# Patient Record
Sex: MALE | URBAN - METROPOLITAN AREA
[De-identification: names, ages, dates, MRNs, and addresses within clinical notes are randomized per-mention and may not be internally consistent; named-entity substitution may affect disease eponyms.]

---

## 2020-05-07 ENCOUNTER — APPOINTMENT (RX ONLY)
Dept: URBAN - METROPOLITAN AREA CLINIC 35 | Facility: CLINIC | Age: 35
Setting detail: DERMATOLOGY
End: 2020-05-07

## 2020-05-07 DIAGNOSIS — L50.5 CHOLINERGIC URTICARIA: ICD-10-CM

## 2020-05-07 PROBLEM — L50.1 IDIOPATHIC URTICARIA: Status: ACTIVE | Noted: 2020-05-07

## 2020-05-07 PROCEDURE — ? COUNSELING

## 2020-05-07 PROCEDURE — 99202 OFFICE O/P NEW SF 15 MIN: CPT | Mod: 95

## 2020-05-07 PROCEDURE — ? DIAGNOSIS COMMENT

## 2020-05-07 PROCEDURE — ? TREATMENT REGIMEN

## 2020-05-07 ASSESSMENT — LOCATION ZONE DERM
LOCATION ZONE: ARM
LOCATION ZONE: TRUNK

## 2020-05-07 ASSESSMENT — LOCATION DETAILED DESCRIPTION DERM
LOCATION DETAILED: EPIGASTRIC SKIN
LOCATION DETAILED: LEFT ANTERIOR SHOULDER
LOCATION DETAILED: RIGHT ANTERIOR SHOULDER
LOCATION DETAILED: RIGHT MEDIAL INFERIOR CHEST

## 2020-05-07 ASSESSMENT — LOCATION SIMPLE DESCRIPTION DERM
LOCATION SIMPLE: LEFT SHOULDER
LOCATION SIMPLE: CHEST
LOCATION SIMPLE: ABDOMEN
LOCATION SIMPLE: RIGHT SHOULDER

## 2020-05-07 NOTE — PROCEDURE: TREATMENT REGIMEN
Detail Level: Zone
Initiate Treatment: Allegra, Claritin, Zyrtec or other non-sedating antihistamine - BID\\nRecommend mineral sunscreens applied before sun exposure

## 2020-05-07 NOTE — HPI: RASH
What Type Of Note Output Would You Prefer (Optional)?: Standard Output
Is The Patient Presenting As Previously Scheduled?: Yes
How Severe Is Your Rash?: moderate
Is This A New Presentation, Or A Follow-Up?: Rash
Additional History: Ice alleviates if applied at prodrome

## 2020-05-07 NOTE — PROCEDURE: DIAGNOSIS COMMENT
Detail Level: Simple
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 Discussed that urticaria can be induced by both sweating and UV light; recommended physical sunscreen and antihistamine regimen of 2 tabs of Claritin daily; discussed if this helps but does not clear symptoms, consider increasing antihistamine regimen further. If persistent, consider alternative photosensitive conditions such as PMLE.

## 2020-06-18 ENCOUNTER — APPOINTMENT (RX ONLY)
Dept: URBAN - METROPOLITAN AREA CLINIC 35 | Facility: CLINIC | Age: 35
Setting detail: DERMATOLOGY
End: 2020-06-18

## 2020-06-18 DIAGNOSIS — L50.5 CHOLINERGIC URTICARIA: ICD-10-CM | Status: RESOLVING

## 2020-06-18 PROCEDURE — 99213 OFFICE O/P EST LOW 20 MIN: CPT

## 2020-06-18 PROCEDURE — ? COUNSELING

## 2020-06-18 PROCEDURE — ? DIAGNOSIS COMMENT

## 2020-06-18 PROCEDURE — ? TREATMENT REGIMEN

## 2020-06-18 ASSESSMENT — LOCATION SIMPLE DESCRIPTION DERM
LOCATION SIMPLE: ABDOMEN
LOCATION SIMPLE: CHEST
LOCATION SIMPLE: LEFT SHOULDER
LOCATION SIMPLE: RIGHT SHOULDER

## 2020-06-18 ASSESSMENT — LOCATION DETAILED DESCRIPTION DERM
LOCATION DETAILED: EPIGASTRIC SKIN
LOCATION DETAILED: RIGHT MEDIAL INFERIOR CHEST
LOCATION DETAILED: LEFT ANTERIOR SHOULDER
LOCATION DETAILED: RIGHT ANTERIOR SHOULDER

## 2020-06-18 ASSESSMENT — LOCATION ZONE DERM
LOCATION ZONE: TRUNK
LOCATION ZONE: ARM

## 2020-06-18 NOTE — PROCEDURE: TREATMENT REGIMEN
Otc Regimen: CeraVe itch relief or Sarna original as needed
Plan: Allegra 2 tabs in AM\\nZyrtec 2 tabs in PM\\n\\n** When itching has subsided, can decrease Allegra and Zyrtec to 1 tab each **
Detail Level: Zone

## 2020-06-18 NOTE — PROCEDURE: DIAGNOSIS COMMENT
Detail Level: Simple
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 Discussed that urticaria can be induced by both sweating and UV light; recommended physical sunscreen and antihistamine regimen of 2 tabs of Allegra qam and 2 tabs of Claritin qpm. If well-controlled, taper by 1 tab of either Allergra or Claritin q2 weeks

## 2020-12-04 ENCOUNTER — APPOINTMENT (RX ONLY)
Dept: URBAN - METROPOLITAN AREA CLINIC 35 | Facility: CLINIC | Age: 35
Setting detail: DERMATOLOGY
End: 2020-12-04

## 2020-12-04 DIAGNOSIS — L50.5 CHOLINERGIC URTICARIA: ICD-10-CM

## 2020-12-04 PROCEDURE — 99213 OFFICE O/P EST LOW 20 MIN: CPT | Mod: 95

## 2020-12-04 PROCEDURE — ? DIAGNOSIS COMMENT

## 2020-12-04 PROCEDURE — ? COUNSELING

## 2020-12-04 PROCEDURE — ? TREATMENT REGIMEN

## 2020-12-04 ASSESSMENT — LOCATION ZONE DERM
LOCATION ZONE: TRUNK
LOCATION ZONE: ARM

## 2020-12-04 ASSESSMENT — LOCATION DETAILED DESCRIPTION DERM
LOCATION DETAILED: RIGHT ANTERIOR SHOULDER
LOCATION DETAILED: EPIGASTRIC SKIN
LOCATION DETAILED: RIGHT MEDIAL INFERIOR CHEST
LOCATION DETAILED: LEFT ANTERIOR SHOULDER

## 2020-12-04 ASSESSMENT — LOCATION SIMPLE DESCRIPTION DERM
LOCATION SIMPLE: CHEST
LOCATION SIMPLE: ABDOMEN
LOCATION SIMPLE: RIGHT SHOULDER
LOCATION SIMPLE: LEFT SHOULDER

## 2020-12-04 NOTE — PROCEDURE: TREATMENT REGIMEN
Problem: Communication  Goal: The ability to communicate needs accurately and effectively will improve  Outcome: MET Date Met: 10/26/18  Patient calls approp for needs, english speaking, uses call bell       
Otc Regimen: CeraVe itch relief or Sarna original as needed
Plan: Allegra 2 tabs in AM\\nZyrtec 2 tabs in PM\\n\\n** When itching has subsided, can decrease Allegra and Zyrtec to 1 tab each **
Detail Level: Zone

## 2020-12-04 NOTE — PROCEDURE: DIAGNOSIS COMMENT
Detail Level: Simple
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 Discussed that urticaria can be induced by both sweating and UV light; stinging with chemical sunscreen so recommended physical sunscreen \\n-Pt taking 1 Allegra and 1 Claritin daily; recommended antihistamine regimen of 2 tabs of Allegra qam and 2 tabs of Claritin qpm\\n-Discussed use of propranolol; should be evaluated by primary care doctor for blood pressure and heart health before starting. Pt has appointment with PCP soon\\n-Discussed other atypical reasons for chronic urticaria including parasitic infection. Pt is traveler; first flare during travel to [facility]. Discussed parasitic infections would likely not worsen with heat/sweating but could order CBC with diff to check for eosinophils\\n-Consider checking IgE levels \\n-Discussed possible punch biopsy after inducing hives in office. Results may not change treatment plan.\\n-Sent rx for trial of TAC To use on urticarial lesions for extreme pruritus; sent rx to use BID up to 14 days

## 2020-12-09 ENCOUNTER — RX ONLY (OUTPATIENT)
Age: 35
Setting detail: RX ONLY
End: 2020-12-09

## 2020-12-09 RX ORDER — TRIAMCINOLONE ACETONIDE 1 MG/G
CREAM TOPICAL
Qty: 2 | Refills: 3 | Status: ERX | COMMUNITY
Start: 2020-12-09